# Patient Record
Sex: MALE | Race: WHITE | NOT HISPANIC OR LATINO
[De-identification: names, ages, dates, MRNs, and addresses within clinical notes are randomized per-mention and may not be internally consistent; named-entity substitution may affect disease eponyms.]

---

## 2020-12-29 ENCOUNTER — TRANSCRIPTION ENCOUNTER (OUTPATIENT)
Age: 35
End: 2020-12-29

## 2021-08-24 ENCOUNTER — TRANSCRIPTION ENCOUNTER (OUTPATIENT)
Age: 36
End: 2021-08-24

## 2022-07-11 PROBLEM — Z00.00 ENCOUNTER FOR PREVENTIVE HEALTH EXAMINATION: Status: ACTIVE | Noted: 2022-07-11

## 2022-07-12 ENCOUNTER — TRANSCRIPTION ENCOUNTER (OUTPATIENT)
Age: 37
End: 2022-07-12

## 2022-07-12 ENCOUNTER — APPOINTMENT (OUTPATIENT)
Dept: ORTHOPEDIC SURGERY | Facility: CLINIC | Age: 37
End: 2022-07-12

## 2022-07-12 VITALS — BODY MASS INDEX: 27.09 KG/M2 | WEIGHT: 200 LBS | HEIGHT: 72 IN

## 2022-07-12 DIAGNOSIS — M65.331 TRIGGER FINGER, RIGHT MIDDLE FINGER: ICD-10-CM

## 2022-07-12 PROCEDURE — J3490M: CUSTOM

## 2022-07-12 PROCEDURE — 20550 NJX 1 TENDON SHEATH/LIGAMENT: CPT

## 2022-07-12 PROCEDURE — 99203 OFFICE O/P NEW LOW 30 MIN: CPT | Mod: 25

## 2022-07-12 NOTE — HISTORY OF PRESENT ILLNESS
[Gradual] : gradual [9] : 9 [3] : 3 [Dull/Aching] : dull/aching [Sharp] : sharp [Sleep] : sleep [Ice] : ice [de-identified] : R MF pain that is worse at night- stiffness\par No injury\par Started about 1-2 weeks ago [] : no [FreeTextEntry1] : Right Middle Finger  [FreeTextEntry3] : a week ago  [FreeTextEntry5] : he is having pain, swelling and cant bend his Right MF, no injury  [FreeTextEntry9] : aleve  [de-identified] : activity

## 2022-07-12 NOTE — PHYSICAL EXAM
[de-identified] : R hand: \par Mild swelling \par Tender 3rd A1 pulley \par Decreased middle ROM \par

## 2022-08-01 ENCOUNTER — NON-APPOINTMENT (OUTPATIENT)
Age: 37
End: 2022-08-01

## 2023-07-04 ENCOUNTER — EMERGENCY (EMERGENCY)
Facility: HOSPITAL | Age: 38
LOS: 1 days | Discharge: HOME | End: 2023-07-04
Attending: EMERGENCY MEDICINE
Payer: COMMERCIAL

## 2023-07-04 VITALS
HEART RATE: 76 BPM | HEIGHT: 72 IN | RESPIRATION RATE: 20 BRPM | DIASTOLIC BLOOD PRESSURE: 82 MMHG | TEMPERATURE: 98 F | OXYGEN SATURATION: 98 % | WEIGHT: 190.04 LBS | SYSTOLIC BLOOD PRESSURE: 132 MMHG

## 2023-07-04 VITALS
SYSTOLIC BLOOD PRESSURE: 134 MMHG | DIASTOLIC BLOOD PRESSURE: 83 MMHG | HEART RATE: 82 BPM | OXYGEN SATURATION: 97 % | TEMPERATURE: 98 F | RESPIRATION RATE: 16 BRPM

## 2023-07-04 PROCEDURE — 71045 X-RAY EXAM CHEST 1 VIEW: CPT

## 2023-07-04 PROCEDURE — 73020 X-RAY EXAM OF SHOULDER: CPT | Mod: 26,59,LT

## 2023-07-04 PROCEDURE — 73562 X-RAY EXAM OF KNEE 3: CPT | Mod: 26,LT

## 2023-07-04 PROCEDURE — 73030 X-RAY EXAM OF SHOULDER: CPT

## 2023-07-04 PROCEDURE — 73030 X-RAY EXAM OF SHOULDER: CPT | Mod: 26,LT

## 2023-07-04 PROCEDURE — 73060 X-RAY EXAM OF HUMERUS: CPT | Mod: 26,LT

## 2023-07-04 PROCEDURE — 73562 X-RAY EXAM OF KNEE 3: CPT

## 2023-07-04 PROCEDURE — 99285 EMERGENCY DEPT VISIT HI MDM: CPT

## 2023-07-04 PROCEDURE — 71045 X-RAY EXAM CHEST 1 VIEW: CPT | Mod: 26

## 2023-07-04 PROCEDURE — 73000 X-RAY EXAM OF COLLAR BONE: CPT

## 2023-07-04 PROCEDURE — 99284 EMERGENCY DEPT VISIT MOD MDM: CPT | Mod: 25

## 2023-07-04 PROCEDURE — 90715 TDAP VACCINE 7 YRS/> IM: CPT

## 2023-07-04 PROCEDURE — 73060 X-RAY EXAM OF HUMERUS: CPT

## 2023-07-04 PROCEDURE — 96372 THER/PROPH/DIAG INJ SC/IM: CPT

## 2023-07-04 PROCEDURE — 73000 X-RAY EXAM OF COLLAR BONE: CPT | Mod: 26,LT

## 2023-07-04 PROCEDURE — 73020 X-RAY EXAM OF SHOULDER: CPT

## 2023-07-04 PROCEDURE — 90471 IMMUNIZATION ADMIN: CPT

## 2023-07-04 RX ORDER — OXYCODONE HYDROCHLORIDE 5 MG/1
1 TABLET ORAL
Qty: 12 | Refills: 0
Start: 2023-07-04 | End: 2023-07-06

## 2023-07-04 RX ORDER — OXYCODONE HYDROCHLORIDE 5 MG/1
5 TABLET ORAL ONCE
Refills: 0 | Status: DISCONTINUED | OUTPATIENT
Start: 2023-07-04 | End: 2023-07-04

## 2023-07-04 RX ORDER — TETANUS TOXOID, REDUCED DIPHTHERIA TOXOID AND ACELLULAR PERTUSSIS VACCINE, ADSORBED 5; 2.5; 8; 8; 2.5 [IU]/.5ML; [IU]/.5ML; UG/.5ML; UG/.5ML; UG/.5ML
0.5 SUSPENSION INTRAMUSCULAR ONCE
Refills: 0 | Status: COMPLETED | OUTPATIENT
Start: 2023-07-04 | End: 2023-07-04

## 2023-07-04 RX ORDER — KETOROLAC TROMETHAMINE 30 MG/ML
15 SYRINGE (ML) INJECTION ONCE
Refills: 0 | Status: DISCONTINUED | OUTPATIENT
Start: 2023-07-04 | End: 2023-07-04

## 2023-07-04 RX ADMIN — Medication 15 MILLIGRAM(S): at 08:28

## 2023-07-04 RX ADMIN — OXYCODONE HYDROCHLORIDE 5 MILLIGRAM(S): 5 TABLET ORAL at 09:51

## 2023-07-04 RX ADMIN — Medication 15 MILLIGRAM(S): at 09:49

## 2023-07-04 RX ADMIN — TETANUS TOXOID, REDUCED DIPHTHERIA TOXOID AND ACELLULAR PERTUSSIS VACCINE, ADSORBED 0.5 MILLILITER(S): 5; 2.5; 8; 8; 2.5 SUSPENSION INTRAMUSCULAR at 08:29

## 2023-07-04 NOTE — ED PROVIDER NOTE - PROGRESS NOTE DETAILS
Masoud Rivera MD PGY2: Clavicle with comminuted fxr, delpeau view of shoulder added and obtained. Oxycodone given for ongoing pain. Discussed with pt follow up with orthopedics, analgesia, return precautions. Understands and is amenable at this time. Masoud Rivera MD PGY2: Clavicle with comminuted fxr, delpeau view of shoulder added and obtained. Oxycodone given for ongoing pain. With loose fragment orientation consulted orthopedics, will see pt. Masoud Rivera MD PGY2: Clavicle with comminuted fxr, delpeau view of shoulder added and obtained. Shoulder no fxr or dislocation. Knee neg. Oxycodone given for ongoing pain. With loose fragment orientation consulted orthopedics, will see pt. Masoud Rivera MD PGY2: Ortho evaluated pt, no acute intervention. Rec follow up with orthopedics. Discussed results, follow up, return precautions, analgesia with pt. Understands and is amenable at this time.

## 2023-07-04 NOTE — ED ADULT NURSE REASSESSMENT NOTE - NS ED NURSE REASSESS COMMENT FT1
Patient d/c. Reviewed d/c paperwork with patients, all questions answered at this time. Patient verbalizes understanding. Patient instructed to return to the ER for any worsening s/s including chest pain, SOB, fever, n/v/d. Patient alert and stable at time of d/c. Patient will follow up with ortho.

## 2023-07-04 NOTE — ED PROVIDER NOTE - NSFOLLOWUPINSTRUCTIONS_ED_ALL_ED_FT
You were seen in the ED for Left shoulder injury    Your x rays showed that you have a fractured (broken) collarbone, called your clavicle.     Please follow up with your orthopedist as soon as possible, within 1 week. If you cannot follow up with your orthopedist, you may call the number below to schedule an appointment to follow up with [facility].     You may take Tylenol up to 1000mg every 6 hours as needed for pain.  You may take Ibuprofen up to 400mg every 6 hours as needed for pain.  Please do not take more than the recommended doses of each.    Please keep your affected extremity in a sling when possible.  You may remove the sling every 3-4 hours to move your wrist and your elbow while keeping your shoulder still.    Seek immediate medical attention if you experience New or worsening symptoms, inability to feel or move your affected hand, new vision changes, dizziness, loss of consciousness. You were seen in the ED for Left shoulder injury    Your x rays showed that you have a fractured (broken) collarbone, called your clavicle.     Please follow up with your orthopedist as soon as possible, within 1 week. If you cannot follow up with your orthopedist, you may call the number below to schedule an appointment to follow up with [facility].     You may take Tylenol up to 1000mg every 6 hours as needed for pain.  You may take Ibuprofen up to 400mg every 6 hours as needed for pain.  Please do not take more than the recommended doses of each.    Please keep your affected extremity in a sling when possible.  You may remove the sling every 3-4 hours to move your wrist and your elbow while keeping your shoulder still at your side.    Seek immediate medical attention if you experience New or worsening symptoms, inability to feel or move your affected hand, new vision changes, dizziness, loss of consciousness. You were seen in the ED for Left shoulder injury    Your x rays showed that you have a fractured (broken) collarbone, called your clavicle.     Please follow up with your orthopedist Dr. Ring as soon as possible, within 1 week. You may call the number below to schedule an appointment to follow up with [facility].     You may take Tylenol up to 1000mg every 6 hours as needed for pain.  You may take Ibuprofen up to 400mg every 6 hours as needed for pain.  Please do not take more than the recommended doses of each. For breakthrough severe pain, you were prescribed oxycodone, you may take up to 1 tablet every 6 hours as needed for severe pain.    Please keep your affected extremity in a sling when possible.  You may remove the sling every 3-4 hours to move your wrist and your elbow while keeping your shoulder still at your side.    Seek immediate medical attention if you experience New or worsening symptoms, inability to feel or move your affected hand, new vision changes, dizziness, loss of consciousness.

## 2023-07-04 NOTE — ED PROVIDER NOTE - CARE PROVIDER_API CALL
Jorge Ring  Orthopaedic Surgery  [address] [place] [address], [facility] [address]  [place] [address]  Phone: [phone]  Fax: [phone]  Follow Up Time:
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
## 2023-07-04 NOTE — ED PROVIDER NOTE - PATIENT PORTAL LINK FT
You can access the FollowMyHealth Patient Portal offered by [facility] by registering at the following website: [email or web address]. By joining [facility]’s FollowMyHealth portal, you will also be able to view your health information using other applications (apps) compatible with our system.

## 2023-07-04 NOTE — ED PROVIDER NOTE - CLINICAL SUMMARY MEDICAL DECISION MAKING FREE TEXT BOX
Patient is a 38-year-old male no significant past medical history presenting for shoulder injury. Currently with vital signs notably within 1 limits and stable, presenting for left shoulder injury with examination concerning for bony prominence and crepitus to left clavicle, distally neurovascularly intact.  Possible clavicular fracture, with poor range of motion cannot exclude glenohumeral subluxation/dislocation versus humeral head fracture.  Discussed with patient Tylenol use.  To eval with x-rays, give analgesia, reassess.
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
## 2023-07-04 NOTE — ED PROVIDER NOTE - NS ED ATTENDING STATEMENT MOD
Called patient and notified him of spike protein level. Patient denied any questions at this time.    Attending with

## 2023-07-04 NOTE — ED ADULT NURSE NOTE - NSFALLUNIVINTERV_ED_ALL_ED
Bed/Stretcher in lowest position, wheels locked, appropriate side rails in place/Call bell, personal items and telephone in reach/Instruct patient to call for assistance before getting out of bed/chair/stretcher/Non-slip footwear applied when patient is off stretcher/[place] to call system/Physically safe environment - no spills, clutter or unnecessary equipment/Purposeful proactive rounding/Room/bathroom lighting operational, light cord in reach

## 2023-07-04 NOTE — ED PROCEDURE NOTE - ATTENDING CONTRIBUTION TO CARE
I, EM Attending, Aaron Holly was present for and supervised the critical portions of the procedure performed by the Resident Physician or HORTENCIA.
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
## 2023-07-04 NOTE — ED PROVIDER NOTE - PHYSICAL EXAMINATION
CONSTITUTIONAL: awake, appears in pain  SKIN: abrasions to L elbow, anterior L knee  HEAD: Normocephalic; atraumatic.  EYES: no conjunctival injection. PERRL. no scleral icterus  ENT: No nasal discharge; airway clear.  NECK: Supple; non tender.  CARD: S1, S2 normal; no murmurs, gallops, or rubs. Regular rate and rhythm.   RESP: No wheezes, rales or rhonchi. Good air movement bilaterally.   ABD: soft ntnd, no guarding, no distention, no rigidity.   MSK: L clavicle deviated with bony prominence, crepitus on palpation, tender, no overlying skin breakage. L anterior shoulder nontender, mild ttp mid thoracic paraspinal area, no tenderness to scapular spine or tip. L shoulder poor active and passive range with pain. L elbow, wrist, hand rom and strength intact, sensation intact, pulses intact. RUE, BLE with full range, strength, sensation, pulses intact and no tenderness or bony deformity. Chest wall other than L clavicle without tenderness or bony deformity. Pelvis stable. Midline spine cervical, thoracic, lumbar without tenderness or step offs.  NEURO: Alert, oriented, grossly unremarkable.   PSYCH: Cooperative, appropriate.

## 2023-07-11 ENCOUNTER — TRANSCRIPTION ENCOUNTER (OUTPATIENT)
Age: 38
End: 2023-07-11

## 2023-07-11 ENCOUNTER — OUTPATIENT (OUTPATIENT)
Dept: OUTPATIENT SERVICES | Facility: HOSPITAL | Age: 38
LOS: 1 days | End: 2023-07-11
Payer: COMMERCIAL

## 2023-07-11 VITALS
WEIGHT: 198.64 LBS | HEIGHT: 72 IN | RESPIRATION RATE: 16 BRPM | SYSTOLIC BLOOD PRESSURE: 120 MMHG | OXYGEN SATURATION: 100 % | HEART RATE: 62 BPM | TEMPERATURE: 98 F | DIASTOLIC BLOOD PRESSURE: 70 MMHG

## 2023-07-11 DIAGNOSIS — S42.022A DISPLACED FRACTURE OF SHAFT OF LEFT CLAVICLE, INITIAL ENCOUNTER FOR CLOSED FRACTURE: ICD-10-CM

## 2023-07-11 DIAGNOSIS — Z01.818 ENCOUNTER FOR OTHER PREPROCEDURAL EXAMINATION: ICD-10-CM

## 2023-07-11 DIAGNOSIS — S42.009A FRACTURE OF UNSPECIFIED PART OF UNSPECIFIED CLAVICLE, INITIAL ENCOUNTER FOR CLOSED FRACTURE: ICD-10-CM

## 2023-07-11 LAB
HCT VFR BLD CALC: 39.9 % — SIGNIFICANT CHANGE UP (ref 39–50)
HGB BLD-MCNC: 13.3 G/DL — SIGNIFICANT CHANGE UP (ref 13–17)
MCHC RBC-ENTMCNC: 31.1 PG — SIGNIFICANT CHANGE UP (ref 27–34)
MCHC RBC-ENTMCNC: 33.3 GM/DL — SIGNIFICANT CHANGE UP (ref 32–36)
MCV RBC AUTO: 93.2 FL — SIGNIFICANT CHANGE UP (ref 80–100)
NRBC # BLD: 0 /100 WBCS — SIGNIFICANT CHANGE UP (ref 0–0)
PLATELET # BLD AUTO: 206 K/UL — SIGNIFICANT CHANGE UP (ref 150–400)
RBC # BLD: 4.28 M/UL — SIGNIFICANT CHANGE UP (ref 4.2–5.8)
RBC # FLD: 12.1 % — SIGNIFICANT CHANGE UP (ref 10.3–14.5)
WBC # BLD: 6.14 K/UL — SIGNIFICANT CHANGE UP (ref 3.8–10.5)
WBC # FLD AUTO: 6.14 K/UL — SIGNIFICANT CHANGE UP (ref 3.8–10.5)

## 2023-07-11 RX ORDER — SODIUM CHLORIDE 9 MG/ML
3 INJECTION INTRAMUSCULAR; INTRAVENOUS; SUBCUTANEOUS EVERY 8 HOURS
Refills: 0 | Status: DISCONTINUED | OUTPATIENT
Start: 2023-07-12 | End: 2023-07-26

## 2023-07-11 RX ORDER — LIDOCAINE HCL 20 MG/ML
0.2 VIAL (ML) INJECTION ONCE
Refills: 0 | Status: DISCONTINUED | OUTPATIENT
Start: 2023-07-12 | End: 2023-07-26

## 2023-07-11 RX ORDER — CHLORHEXIDINE GLUCONATE 213 G/1000ML
1 SOLUTION TOPICAL ONCE
Refills: 0 | Status: DISCONTINUED | OUTPATIENT
Start: 2023-07-12 | End: 2023-07-26

## 2023-07-11 NOTE — H&P PST ADULT - MUSCULOSKELETAL
ROM intact/no joint swelling/normal gait/strength 5/5 bilateral upper extremities/strength 5/5 bilateral lower extremities details…
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
## 2023-07-11 NOTE — H&P PST ADULT - ASSESSMENT
Open reduction & internal fixation of left clavicle on 07/12/23    Activity:   physically pretty active, gym 3-4x/week   Energy Expenditure score (DASI SCORE METS): 10  Symptoms : denies chest pain, palpitations, dyspnea, dizziness or  MELENDEZ,     Airway: normal  Dental: Patient denies Loose teeth/Denture.

## 2023-07-11 NOTE — H&P PST ADULT - NSANTHOSAYNRD_GEN_A_CORE
No. MOISÉS screening performed.  STOP BANG Legend: 0-2 = LOW Risk; 3-4 = INTERMEDIATE Risk; 5-8 = HIGH Risk

## 2023-07-11 NOTE — H&P PST ADULT - PSYCHIATRIC COMMENTS
affect , insight & judgement intact, characteristics of appearance, behaviour & verbalization are appropriate

## 2023-07-11 NOTE — H&P PST ADULT - NSICDXPROCEDURE_GEN_ALL_CORE_FT
PROCEDURES:  Open reduction and internal fixation (ORIF) of fracture of left clavicle 11-Jul-2023 07:03:00  Endy Cummins

## 2023-07-11 NOTE — H&P PST ADULT - PROBLEM SELECTOR PLAN 1
open reduction & internal fixation of left clavicle on 07/12/23   Instructed to inform surgeon & seek medical attention if any changes in health  status like fever, chills, rash, infections, chest pain or any changes in health status  . PST  instructions given in written/ explained about NPO, PREOP SKIN CARE  with antibacterial chlorhexidine wash today & tomorrow before Surgery(Hibiclens given) and ARRIVAL TIME  2 hours prior to OR time .Pre-op education provided - all questions answered. Pt verbalized understanding.

## 2023-07-12 ENCOUNTER — OUTPATIENT (OUTPATIENT)
Dept: INPATIENT UNIT | Facility: HOSPITAL | Age: 38
LOS: 1 days | End: 2023-07-12
Payer: COMMERCIAL

## 2023-07-12 ENCOUNTER — TRANSCRIPTION ENCOUNTER (OUTPATIENT)
Age: 38
End: 2023-07-12

## 2023-07-12 VITALS
HEIGHT: 72 IN | WEIGHT: 198.64 LBS | OXYGEN SATURATION: 100 % | SYSTOLIC BLOOD PRESSURE: 120 MMHG | DIASTOLIC BLOOD PRESSURE: 77 MMHG | HEART RATE: 64 BPM | TEMPERATURE: 98 F | RESPIRATION RATE: 16 BRPM

## 2023-07-12 VITALS — OXYGEN SATURATION: 99 % | HEART RATE: 77 BPM | RESPIRATION RATE: 17 BRPM

## 2023-07-12 DIAGNOSIS — S42.022A DISPLACED FRACTURE OF SHAFT OF LEFT CLAVICLE, INITIAL ENCOUNTER FOR CLOSED FRACTURE: ICD-10-CM

## 2023-07-12 PROCEDURE — C1713: CPT

## 2023-07-12 PROCEDURE — 23515 OPTX CLAVICULAR FX W/INT FIX: CPT | Mod: LT

## 2023-07-12 PROCEDURE — 36415 COLL VENOUS BLD VENIPUNCTURE: CPT

## 2023-07-12 PROCEDURE — C1769: CPT

## 2023-07-12 PROCEDURE — 76000 FLUOROSCOPY <1 HR PHYS/QHP: CPT

## 2023-07-12 PROCEDURE — G0463: CPT

## 2023-07-12 PROCEDURE — 85027 COMPLETE CBC AUTOMATED: CPT

## 2023-07-12 PROCEDURE — C9399: CPT

## 2023-07-12 DEVICE — SCREW NON-LOCKG 3.5X28: Type: IMPLANTABLE DEVICE | Site: LEFT | Status: FUNCTIONAL

## 2023-07-12 DEVICE — SCREW 2.7X20MM: Type: IMPLANTABLE DEVICE | Site: LEFT | Status: FUNCTIONAL

## 2023-07-12 DEVICE — SCREW 2.7X18MM: Type: IMPLANTABLE DEVICE | Site: LEFT | Status: FUNCTIONAL

## 2023-07-12 DEVICE — SCREW LOCKING 3.5X20: Type: IMPLANTABLE DEVICE | Site: LEFT | Status: FUNCTIONAL

## 2023-07-12 DEVICE — SCREW VARIAX LOCKING 3.5X18MM: Type: IMPLANTABLE DEVICE | Site: LEFT | Status: FUNCTIONAL

## 2023-07-12 DEVICE — IMPLANTABLE DEVICE: Type: IMPLANTABLE DEVICE | Site: LEFT | Status: FUNCTIONAL

## 2023-07-12 DEVICE — SCREW 2.7X16MM: Type: IMPLANTABLE DEVICE | Site: LEFT | Status: FUNCTIONAL

## 2023-07-12 DEVICE — SCREW 3.5X22: Type: IMPLANTABLE DEVICE | Site: LEFT | Status: FUNCTIONAL

## 2023-07-12 DEVICE — K-WIRE STRYKER 2MM X 150MM: Type: IMPLANTABLE DEVICE | Site: LEFT | Status: FUNCTIONAL

## 2023-07-12 RX ORDER — HYDROMORPHONE HYDROCHLORIDE 2 MG/ML
0.25 INJECTION INTRAMUSCULAR; INTRAVENOUS; SUBCUTANEOUS
Refills: 0 | Status: DISCONTINUED | OUTPATIENT
Start: 2023-07-12 | End: 2023-07-12

## 2023-07-12 RX ORDER — CEFAZOLIN SODIUM 1 G
2000 VIAL (EA) INJECTION ONCE
Refills: 0 | Status: COMPLETED | OUTPATIENT
Start: 2023-07-12 | End: 2023-07-12

## 2023-07-12 RX ORDER — OXYCODONE HYDROCHLORIDE 5 MG/1
1 TABLET ORAL
Qty: 20 | Refills: 0
Start: 2023-07-12 | End: 2023-07-16

## 2023-07-12 RX ORDER — LORATADINE 10 MG/1
1 TABLET ORAL
Refills: 0 | DISCHARGE

## 2023-07-12 RX ORDER — OXYCODONE HYDROCHLORIDE 5 MG/1
5 TABLET ORAL EVERY 4 HOURS
Refills: 0 | Status: DISCONTINUED | OUTPATIENT
Start: 2023-07-12 | End: 2023-07-12

## 2023-07-12 RX ADMIN — OXYCODONE HYDROCHLORIDE 5 MILLIGRAM(S): 5 TABLET ORAL at 12:49

## 2023-07-12 RX ADMIN — OXYCODONE HYDROCHLORIDE 5 MILLIGRAM(S): 5 TABLET ORAL at 12:25

## 2023-07-12 NOTE — PRE-ANESTHESIA EVALUATION ADULT - NSANTHADDINFOFT_GEN_ALL_CORE
The patient was seen and evaluated in the holding area prior to entering the operating room.  Risks and benefits of the anesthetic plan discussed with the patient.

## 2023-07-12 NOTE — ASU DISCHARGE PLAN (ADULT/PEDIATRIC) - NS MD DC FALL RISK RISK
For information on Fall & Injury Prevention, visit: [email or web address] OR  [email or web address] OR  https://www.cdc.gov/steadi/patient.html

## 2023-07-12 NOTE — ASU DISCHARGE PLAN (ADULT/PEDIATRIC) - ASU DC SPECIAL INSTRUCTIONSFT
Follow up with Dr Ring in 1-2 weeks. Call office for appointment. Take medications as prescribed. Keep dressing clean, dry, and intact. Non weight bearing left upper extremity in sling. Rest and ice affected extremity.

## 2023-07-12 NOTE — ASU PREOP CHECKLIST -  HIBICLENS SHOWER 2 DATE
Pt to triage with steady gait. PT A&O x 4. PT reports having constipation x 1 week. Pt reports taking an enema tonight and a suppository tonight and had one bowel movement. Pt reports SOB worsening tonight along with feeling generalized weakness. Pt reports she had gastric bypass sx in August, but reports losing about 1-2 lbs lately.
12-Jul-2023

## 2024-02-14 ENCOUNTER — NON-APPOINTMENT (OUTPATIENT)
Age: 39
End: 2024-02-14

## 2024-09-09 ENCOUNTER — NON-APPOINTMENT (OUTPATIENT)
Age: 39
End: 2024-09-09

## (undated) DEVICE — GLV 6.5 PROTEXIS (WHITE)

## (undated) DEVICE — MARKING PEN W RULER

## (undated) DEVICE — DRSG STOCKINETTE IMPERVIOUS MED

## (undated) DEVICE — SUT POLYSORB 0 30" GS-21 UNDYED

## (undated) DEVICE — SUT MONOCRYL 3-0 18" PS-2 UNDYED

## (undated) DEVICE — DRILL BIT STRYKER 2.6MM

## (undated) DEVICE — DRSG STOCKINETTE TUBULAR COTTON 1PLY 6X72"

## (undated) DEVICE — SUCTION YANKAUER NO CONTROL VENT

## (undated) DEVICE — SOL IRR BAG NS 0.9% 3000ML

## (undated) DEVICE — DRAPE INSTRUMENT POUCH 6.75" X 11"

## (undated) DEVICE — WARMING BLANKET LOWER ADULT

## (undated) DEVICE — DRSG COBAN 4"

## (undated) DEVICE — DRILL BIT STRYKER 2X135MM

## (undated) DEVICE — DRSG STOCKINETTE TUBULAR SYNTHETIC 1PLY 3X36"

## (undated) DEVICE — DRAPE IOBAN 23" X 23"

## (undated) DEVICE — GLV 8.5 PROTEXIS (WHITE)

## (undated) DEVICE — DRAPE SPLIT SHEET 77" X 108"

## (undated) DEVICE — BLADE SCALPEL SAFETYLOCK #10

## (undated) DEVICE — MEDICATION LABELS W MARKER

## (undated) DEVICE — VENODYNE/SCD SLEEVE CALF LARGE

## (undated) DEVICE — SUT POLYSORB 2-0 30" GS-21 UNDYED

## (undated) DEVICE — DRAPE MAYO STAND 30"

## (undated) DEVICE — SUT BIOSYN 4-0 27" P-12

## (undated) DEVICE — SPECIMEN CONTAINER 100ML

## (undated) DEVICE — PACK EXTREMITY

## (undated) DEVICE — SOL IRR POUR H2O 250ML

## (undated) DEVICE — DRSG STERISTRIPS 0.5 X 4"

## (undated) DEVICE — GLV 8 PROTEXIS (WHITE)

## (undated) DEVICE — SLING SHOULDER IMMOBILIZER CLINIC LARGE

## (undated) DEVICE — GLV 9 DURAPRENE

## (undated) DEVICE — DRAPE TOWEL BLUE 17" X 24"

## (undated) DEVICE — GOWN TRIMAX LG

## (undated) DEVICE — VISITEC 4X4

## (undated) DEVICE — STRYKER INTERPULSE HANDPIECE W IRR SUCTION TUBE

## (undated) DEVICE — SOL IRR POUR NS 0.9% 500ML

## (undated) DEVICE — GLV 7 PROTEXIS (WHITE)

## (undated) DEVICE — SLING ARM CHIEFTAIN MESH LARGE

## (undated) DEVICE — FOLEY TRAY 16FR 5CC LTX UMETER CLOSED

## (undated) DEVICE — STAPLER SKIN VISI-STAT 35 WIDE

## (undated) DEVICE — GLV 7.5 PROTEXIS (WHITE)

## (undated) DEVICE — POSITIONER FOAM EGG CRATE ULNAR 2PCS (PINK)

## (undated) DEVICE — BLADE SCALPEL SAFETYLOCK #15

## (undated) DEVICE — DRILL BIT STRYKER 2.7MM